# Patient Record
Sex: FEMALE | Race: WHITE | NOT HISPANIC OR LATINO | URBAN - METROPOLITAN AREA
[De-identification: names, ages, dates, MRNs, and addresses within clinical notes are randomized per-mention and may not be internally consistent; named-entity substitution may affect disease eponyms.]

---

## 2024-07-08 ENCOUNTER — TELEPHONE (OUTPATIENT)
Dept: OBSTETRICS AND GYNECOLOGY | Facility: CLINIC | Age: 19
End: 2024-07-08
Payer: COMMERCIAL

## 2024-07-18 ENCOUNTER — LAB (OUTPATIENT)
Dept: LAB | Facility: LAB | Age: 19
End: 2024-07-18
Payer: COMMERCIAL

## 2024-07-18 ENCOUNTER — APPOINTMENT (OUTPATIENT)
Dept: OBSTETRICS AND GYNECOLOGY | Facility: CLINIC | Age: 19
End: 2024-07-18
Payer: COMMERCIAL

## 2024-07-18 VITALS
WEIGHT: 283 LBS | HEIGHT: 66 IN | DIASTOLIC BLOOD PRESSURE: 82 MMHG | SYSTOLIC BLOOD PRESSURE: 134 MMHG | BODY MASS INDEX: 45.48 KG/M2

## 2024-07-18 DIAGNOSIS — O10.019 CHRONIC BENIGN ESSENTIAL HYPERTENSION, ANTEPARTUM (HHS-HCC): ICD-10-CM

## 2024-07-18 DIAGNOSIS — O99.211 OBESITY AFFECTING PREGNANCY IN FIRST TRIMESTER, UNSPECIFIED OBESITY TYPE (HHS-HCC): ICD-10-CM

## 2024-07-18 DIAGNOSIS — Z3A.01 6 WEEKS GESTATION OF PREGNANCY (HHS-HCC): ICD-10-CM

## 2024-07-18 DIAGNOSIS — O99.211 OBESITY AFFECTING PREGNANCY IN FIRST TRIMESTER, UNSPECIFIED OBESITY TYPE (HHS-HCC): Primary | ICD-10-CM

## 2024-07-18 DIAGNOSIS — F41.9 ANXIETY: ICD-10-CM

## 2024-07-18 DIAGNOSIS — Z32.01 PREGNANCY TEST POSITIVE (HHS-HCC): ICD-10-CM

## 2024-07-18 LAB
ABO GROUP (TYPE) IN BLOOD: NORMAL
ANTIBODY SCREEN: NORMAL
ERYTHROCYTE [DISTWIDTH] IN BLOOD BY AUTOMATED COUNT: 18.1 % (ref 11.5–14.5)
HBV SURFACE AG SERPL QL IA: NONREACTIVE
HCT VFR BLD AUTO: 36.4 % (ref 36–46)
HGB BLD-MCNC: 10.3 G/DL (ref 12–16)
HIV 1+2 AB+HIV1 P24 AG SERPL QL IA: NONREACTIVE
MCH RBC QN AUTO: 19.8 PG (ref 26–34)
MCHC RBC AUTO-ENTMCNC: 28.3 G/DL (ref 32–36)
MCV RBC AUTO: 70 FL (ref 80–100)
NRBC BLD-RTO: 0 /100 WBCS (ref 0–0)
PLATELET # BLD AUTO: 328 X10*3/UL (ref 150–450)
RBC # BLD AUTO: 5.2 X10*6/UL (ref 4–5.2)
REFLEX ADDED, ANEMIA PANEL: NORMAL
RH FACTOR (ANTIGEN D): NORMAL
RUBV IGG SERPL IA-ACNC: 1.2 IA
RUBV IGG SERPL QL IA: POSITIVE
TREPONEMA PALLIDUM IGG+IGM AB [PRESENCE] IN SERUM OR PLASMA BY IMMUNOASSAY: NONREACTIVE
WBC # BLD AUTO: 11.8 X10*3/UL (ref 4.4–11.3)

## 2024-07-18 PROCEDURE — 36415 COLL VENOUS BLD VENIPUNCTURE: CPT

## 2024-07-18 PROCEDURE — 87340 HEPATITIS B SURFACE AG IA: CPT

## 2024-07-18 PROCEDURE — 82728 ASSAY OF FERRITIN: CPT

## 2024-07-18 PROCEDURE — 86317 IMMUNOASSAY INFECTIOUS AGENT: CPT

## 2024-07-18 PROCEDURE — 86900 BLOOD TYPING SEROLOGIC ABO: CPT

## 2024-07-18 PROCEDURE — 83036 HEMOGLOBIN GLYCOSYLATED A1C: CPT

## 2024-07-18 PROCEDURE — 85027 COMPLETE CBC AUTOMATED: CPT

## 2024-07-18 PROCEDURE — 86901 BLOOD TYPING SEROLOGIC RH(D): CPT

## 2024-07-18 PROCEDURE — 86780 TREPONEMA PALLIDUM: CPT

## 2024-07-18 PROCEDURE — 87591 N.GONORRHOEAE DNA AMP PROB: CPT

## 2024-07-18 PROCEDURE — 86850 RBC ANTIBODY SCREEN: CPT

## 2024-07-18 PROCEDURE — 84702 CHORIONIC GONADOTROPIN TEST: CPT

## 2024-07-18 PROCEDURE — 87491 CHLMYD TRACH DNA AMP PROBE: CPT

## 2024-07-18 PROCEDURE — 87389 HIV-1 AG W/HIV-1&-2 AB AG IA: CPT

## 2024-07-18 PROCEDURE — 83550 IRON BINDING TEST: CPT

## 2024-07-18 PROCEDURE — 0500F INITIAL PRENATAL CARE VISIT: CPT | Performed by: OBSTETRICS & GYNECOLOGY

## 2024-07-18 PROCEDURE — 87086 URINE CULTURE/COLONY COUNT: CPT

## 2024-07-18 RX ORDER — ASPIRIN 81 MG/1
162 TABLET ORAL DAILY
COMMUNITY

## 2024-07-18 RX ORDER — HYDROXYZINE HYDROCHLORIDE 25 MG/1
25 TABLET, FILM COATED ORAL AS NEEDED
COMMUNITY

## 2024-07-18 ASSESSMENT — EDINBURGH POSTNATAL DEPRESSION SCALE (EPDS)
THE THOUGHT OF HARMING MYSELF HAS OCCURRED TO ME: NEVER
TOTAL SCORE: 9
I HAVE BEEN ANXIOUS OR WORRIED FOR NO GOOD REASON: YES, VERY OFTEN
I HAVE LOOKED FORWARD WITH ENJOYMENT TO THINGS: AS MUCH AS I EVER DID
I HAVE FELT SCARED OR PANICKY FOR NO GOOD REASON: YES, QUITE A LOT
I HAVE BEEN ABLE TO LAUGH AND SEE THE FUNNY SIDE OF THINGS: AS MUCH AS I ALWAYS COULD
I HAVE BLAMED MYSELF UNNECESSARILY WHEN THINGS WENT WRONG: YES, SOME OF THE TIME
I HAVE BEEN SO UNHAPPY THAT I HAVE HAD DIFFICULTY SLEEPING: NOT AT ALL
I HAVE BEEN SO UNHAPPY THAT I HAVE BEEN CRYING: NO, NEVER
THINGS HAVE BEEN GETTING ON TOP OF ME: NO, MOST OF THE TIME I HAVE COPED QUITE WELL
I HAVE FELT SAD OR MISERABLE: NO, NOT AT ALL

## 2024-07-18 NOTE — PROGRESS NOTES
Subjective   Patient ID [id number]   Petrona Baker is a 19 y.o.  at Unknown with a working estimated date of delivery of Not found. who presents for an initial prenatal visit. This pregnancy is unplanned.    Her pregnancy is complicated by:  BMI 45 and anxiety.    OB History    Para Term  AB Living   1             SAB IAB Ectopic Multiple Live Births                  # Outcome Date GA Lbr Sekou/2nd Weight Sex Type Anes PTL Lv   1 Current              [place]  Depression Scale Total: 9    Objective   Physical Exam  Weight: 128 kg (283 lb)  Expected Total Weight Gain: 5 kg (11 lb)-9 kg (19 lb)   Pregravid BMI: 46.99  BP: 134/82          Physical Exam  Constitutional:       Appearance: Normal appearance. She is obese.   Genitourinary:      Bladder, rectum and urethral meatus normal.      Right Labia: No rash or lesions.     Left Labia: No lesions or rash.     No vaginal discharge.      No vaginal prolapse present.     No vaginal atrophy present.       Right Adnexa: not tender and no mass present.     Left Adnexa: not tender and no mass present.     No cervical discharge or lesion.      Uterus is enlarged.      Pelvic exam was performed with patient in the lithotomy position.   Breasts:     Breasts are soft.     Right: Normal.      Left: Normal.   HENT:      Head: Normocephalic and atraumatic.      Nose: Nose normal.   Cardiovascular:      Rate and Rhythm: Normal rate and regular rhythm.      Heart sounds: Normal heart sounds.   Pulmonary:      Effort: Pulmonary effort is normal.      Breath sounds: Normal breath sounds.   Abdominal:      Palpations: Abdomen is soft.   Musculoskeletal:      Cervical back: Neck supple.   Neurological:      General: No focal deficit present.      Mental Status: She is alert and oriented to person, place, and time.   Skin:     General: Skin is warm and dry.      Findings: No rash.   Psychiatric:         Mood and Affect: Mood normal.       Prenatal Labs  Are  ordered.    Problem List Items Addressed This Visit       Obesity affecting pregnancy in first trimester ([facility]) - Primary    Overview     Starting BMI 45. Plan  mg starting at 12 weeks.   Plan Hgb A1c.  Plan weekly AP testing by 34 weeks.   Serial growth usn is planned.          Chronic benign essential hypertension, antepartum ([facility])    Overview     Review of EMR shows past mild range BP not requiring medication.  Plan serial growth usn. Plan home BP monitoring. If hypertension is confirmed will plan delivery at 38-39.6 weeks.          Anxiety    Overview     Has used sertraline and hydroxyzine with good results.          6 weeks gestation of pregnancy ([facility])        Prenatal Labs ordered  Daily prenatal vitamins are recommended.  First trimester screening and second trimester screening discussed. Patient decided to proceeded with NIPS and carrier screening.  Will proceed with dating ultrasound.  Plan 13 week early anatomy usn with follow up visit.   Follow up in 4 weeks for return OB visit.

## 2024-07-19 ENCOUNTER — HOSPITAL ENCOUNTER (OUTPATIENT)
Dept: RADIOLOGY | Facility: CLINIC | Age: 19
Discharge: HOME | End: 2024-07-19
Payer: COMMERCIAL

## 2024-07-19 ENCOUNTER — TELEPHONE (OUTPATIENT)
Dept: OBSTETRICS AND GYNECOLOGY | Facility: CLINIC | Age: 19
End: 2024-07-19
Payer: COMMERCIAL

## 2024-07-19 DIAGNOSIS — Z32.01 PREGNANCY TEST POSITIVE (HHS-HCC): ICD-10-CM

## 2024-07-19 DIAGNOSIS — O99.211 OBESITY AFFECTING PREGNANCY IN FIRST TRIMESTER, UNSPECIFIED OBESITY TYPE (HHS-HCC): ICD-10-CM

## 2024-07-19 LAB
B-HCG SERPL-ACNC: <3 MIU/ML
EST. AVERAGE GLUCOSE BLD GHB EST-MCNC: 103 MG/DL
FERRITIN SERPL-MCNC: 10 NG/ML
HBA1C MFR BLD: 5.2 %
IRON SATN MFR SERPL: 2 %
IRON SERPL-MCNC: 10 UG/DL
TIBC SERPL-MCNC: 450 UG/DL
UIBC SERPL-MCNC: 440 UG/DL

## 2024-07-19 PROCEDURE — 76801 OB US < 14 WKS SINGLE FETUS: CPT

## 2024-07-19 PROCEDURE — 76817 TRANSVAGINAL US OBSTETRIC: CPT

## 2024-07-19 NOTE — TELEPHONE ENCOUNTER
Spoke with Petrona regarding no IUP on ultrasound this morning.  She denies any bleeding or pain. Per Dr. Christina will proceed with beta hcg.  Order placed

## 2024-07-20 DIAGNOSIS — A74.9 CHLAMYDIA INFECTION: Primary | ICD-10-CM

## 2024-07-20 PROBLEM — O10.019 CHRONIC BENIGN ESSENTIAL HYPERTENSION, ANTEPARTUM (HHS-HCC): Status: RESOLVED | Noted: 2024-07-18 | Resolved: 2024-07-20

## 2024-07-20 PROBLEM — Z3A.01 6 WEEKS GESTATION OF PREGNANCY (HHS-HCC): Status: RESOLVED | Noted: 2024-07-18 | Resolved: 2024-07-20

## 2024-07-20 PROBLEM — O99.211 OBESITY AFFECTING PREGNANCY IN FIRST TRIMESTER (HHS-HCC): Status: RESOLVED | Noted: 2024-07-18 | Resolved: 2024-07-20

## 2024-07-20 PROBLEM — N93.8 DUB (DYSFUNCTIONAL UTERINE BLEEDING): Status: ACTIVE | Noted: 2024-07-20

## 2024-07-20 LAB
BACTERIA UR CULT: NORMAL
C TRACH RRNA SPEC QL NAA+PROBE: POSITIVE
N GONORRHOEA DNA SPEC QL PROBE+SIG AMP: NEGATIVE

## 2024-07-20 RX ORDER — DOXYCYCLINE 100 MG/1
100 CAPSULE ORAL 2 TIMES DAILY
Qty: 14 CAPSULE | Refills: 0 | Status: SHIPPED | OUTPATIENT
Start: 2024-07-20 | End: 2024-07-27

## 2024-07-22 ENCOUNTER — TELEPHONE (OUTPATIENT)
Dept: OBSTETRICS AND GYNECOLOGY | Facility: CLINIC | Age: 19
End: 2024-07-22
Payer: COMMERCIAL

## 2024-07-22 DIAGNOSIS — N93.8 DUB (DYSFUNCTIONAL UTERINE BLEEDING): Primary | ICD-10-CM

## 2024-07-22 NOTE — TELEPHONE ENCOUNTER
----- Message from Laya Christina sent at 7/20/2024  7:56 PM EDT -----  Chlamydia screen is positive. I have prescribed doxycycline to treat this. Any sexual partner will also need treated. If they do not have a physician I can prescribe this for them also. We can plan a test of cure at a future visit.

## 2024-07-23 LAB
CYTOLOGY CMNT CVX/VAG CYTO-IMP: NORMAL
LAB AP HPV HR: NORMAL
LAB AP PAP ADDITIONAL TESTS: NORMAL
LABORATORY COMMENT REPORT: NORMAL
LMP START DATE: NORMAL
MENSTRUAL HX REPORTED: NORMAL
PATH REPORT.TOTAL CANCER: NORMAL

## 2024-07-24 ENCOUNTER — TELEPHONE (OUTPATIENT)
Dept: OBSTETRICS AND GYNECOLOGY | Facility: CLINIC | Age: 19
End: 2024-07-24
Payer: COMMERCIAL

## 2024-07-24 PROBLEM — R87.619 ASCUS (ATYPICAL SQUAMOUS CELLS OF UNDETERMINED SIGNIFICANCE) ON GYNECOLOGIC PAPANICOLAOU SMEAR COMPLICATING PREGNANCY, ANTEPARTUM: Status: ACTIVE | Noted: 2024-07-24

## 2024-07-24 PROBLEM — O28.2 ASCUS (ATYPICAL SQUAMOUS CELLS OF UNDETERMINED SIGNIFICANCE) ON GYNECOLOGIC PAPANICOLAOU SMEAR COMPLICATING PREGNANCY, ANTEPARTUM: Status: ACTIVE | Noted: 2024-07-24

## 2024-07-24 NOTE — TELEPHONE ENCOUNTER
----- Message from Laya Christina sent at 7/24/2024 12:58 PM EDT -----  We will plan pap again in 12 months as follow up for ASCUS. If there is no symptoms from yeast on pap this does not need treated.

## 2024-12-09 ENCOUNTER — OFFICE VISIT (OUTPATIENT)
Dept: URGENT CARE | Age: 19
End: 2024-12-09
Payer: COMMERCIAL

## 2024-12-09 VITALS
DIASTOLIC BLOOD PRESSURE: 78 MMHG | OXYGEN SATURATION: 98 % | TEMPERATURE: 98.9 F | SYSTOLIC BLOOD PRESSURE: 163 MMHG | HEART RATE: 142 BPM

## 2024-12-09 DIAGNOSIS — J01.00 ACUTE NON-RECURRENT MAXILLARY SINUSITIS: Primary | ICD-10-CM

## 2024-12-09 PROCEDURE — 99204 OFFICE O/P NEW MOD 45 MIN: CPT | Performed by: NURSE PRACTITIONER

## 2024-12-09 RX ORDER — METHYLPREDNISOLONE 4 MG/1
TABLET ORAL
Qty: 21 TABLET | Refills: 0 | Status: SHIPPED | OUTPATIENT
Start: 2024-12-09 | End: 2024-12-15

## 2024-12-09 RX ORDER — AMOXICILLIN AND CLAVULANATE POTASSIUM 875; 125 MG/1; MG/1
875 TABLET, FILM COATED ORAL 2 TIMES DAILY
Qty: 20 TABLET | Refills: 0 | Status: SHIPPED | OUTPATIENT
Start: 2024-12-09

## 2024-12-09 NOTE — PROGRESS NOTES
Subjective   Patient ID: Petrona Baker is a 19 y.o. female. They present today with a chief complaint of Earache (Bi-lat ear pain, lt eye swollen).    History of Present Illness  18 yo female coming in for bilateral ear pain. She states she has had some sinus congestion for about a week and now the ears are hurting. She states she woke up this am with her left eye swollen and crusty.    Past Medical History  Allergies as of 12/09/2024    (No Known Allergies)       (Not in a hospital admission)       Past Medical History:   Diagnosis Date    6 weeks gestation of pregnancy ([facility]) 07/18/2024    NIPS and carrier screening are desired.      Chronic benign essential hypertension, antepartum ([facility]) 07/18/2024    Review of EMR shows past mild range BP not requiring medication.  Plan serial growth usn. Plan home BP monitoring. If hypertension is confirmed will plan delivery at 38-39.6 weeks.       Obesity affecting pregnancy in first trimester ([facility]) 07/18/2024    Starting BMI 45. Plan  mg starting at 12 weeks.   Hgb A1c 5.2%.  Plan weekly AP testing by 34 weeks.   Serial growth usn is planned.       Personal history of other diseases of the nervous system and sense organs 04/26/2016    History of acute otitis media    Unspecified otitis externa, unspecified ear 06/29/2016    Otitis externa       Past Surgical History:   Procedure Laterality Date    ADENOIDECTOMY  03/28/2017    Adenoidectomy        reports that she has never smoked. She has never used smokeless tobacco. She reports that she does not drink alcohol and does not use drugs.    Review of Systems  Review of Systems:  General: No weight loss, fatigue, anorexia, insomnia, fever, chills.  Eyes: No vision loss, double vision, blurred vision, positive left eye redness and drainage, no eye pain.  ENT: No pharyngitis, dry mouth, positive nasal congestion and sinus pressure, positive bilateral ear pain  Cardiac: No chest pain, palpitations, syncope, near  syncope.  Pulmonary:  No shortness of breath, positive cough, no hemoptysis  Heme/lymph: No swollen glands, fever, bleeding  Skin: No rashes  Neuro: No numbness, tingling, headaches                                 Objective    Vitals:    12/09/24 0850   BP: 163/78   Pulse: (!) 142   Temp: 37.2 °C (98.9 °F)   SpO2: 98%     Patient's last menstrual period was 03/24/2024.    Physical Exam  Physical Exam:  General: Vital noted, no distress. Afebrile  EENT:  Right Eye unremarkable, Left eye with conjunctival injection and upper eyelid swelling, Pupils PERRLA, EOMs intact. TMs unremarkable. Posterior oropharynx unremarkable. Uvula in the midline and non-edematous. No PTA. No retropharyngeal mass. No Bunny's angina.  Cardiac: Regular rate and rhythm, no murmur  Pulmonary: Lungs clear bilaterally with good aeration. No adventitious breath sounds.  Skin: No rashes  Neuro: No focal neurologic deficits, NIH score of 0.      Procedures    Point of Care Test & Imaging Results from this visit  No results found for this visit on 12/09/24.   No results found.    Diagnostic study results (if any) were reviewed by YURI Burgos.    Assessment/Plan   Allergies, medications, history, and pertinent labs/EKGs/Imaging reviewed by YURI Bugros.     Medical Decision Making  Treatment: Augmentin and medrol dose pack prescribed. HR auscultated and at 98 and regular  Differential: 1) sinusitis, 2) conjunctivitis , 3)  otitis media  Plan: Patient will follow up with the PCP in the next 2-3 days. Return for any worsening symptoms or go to the ER for further evaluation. Patient understands return precautions and discharge insturctions.  Impression:   1) sinusitis      Orders and Diagnoses  Diagnoses and all orders for this visit:  Acute non-recurrent maxillary sinusitis  -     amoxicillin-pot clavulanate (Augmentin) 875-125 mg tablet; Take 1 tablet (875 mg) by mouth 2 times a day.  -     methylPREDNISolone (Medrol  Cuauhtemocpak) 4 mg tablets; Follow schedule on package instructions      Medical Admin Record      Patient disposition: Home    Electronically signed by YURI Burgos  9:07 AM

## 2024-12-09 NOTE — LETTER
December 9, 2024     Patient: Petrona Baker   YOB: 2005   Date of Visit: 12/9/2024       To Whom It May Concern:    It is my medical opinion that Petrona Baker may return to work on 12/10/24 .    If you have any questions or concerns, please don't hesitate to call.         Sincerely,        Josselyn Hyde, DALLAS-CNP    CC: No Recipients

## 2024-12-22 ENCOUNTER — OFFICE VISIT (OUTPATIENT)
Dept: URGENT CARE | Age: 19
End: 2024-12-22
Payer: COMMERCIAL

## 2024-12-22 VITALS
OXYGEN SATURATION: 98 % | HEART RATE: 134 BPM | TEMPERATURE: 98.8 F | SYSTOLIC BLOOD PRESSURE: 140 MMHG | DIASTOLIC BLOOD PRESSURE: 99 MMHG | RESPIRATION RATE: 20 BRPM

## 2024-12-22 DIAGNOSIS — J06.9 UPPER RESPIRATORY TRACT INFECTION, UNSPECIFIED TYPE: Primary | ICD-10-CM

## 2024-12-22 PROCEDURE — 99213 OFFICE O/P EST LOW 20 MIN: CPT

## 2024-12-22 PROCEDURE — 1036F TOBACCO NON-USER: CPT

## 2024-12-22 RX ORDER — ALBUTEROL SULFATE 90 UG/1
2 INHALANT RESPIRATORY (INHALATION) EVERY 4 HOURS PRN
Qty: 8 G | Refills: 0 | Status: SHIPPED | OUTPATIENT
Start: 2024-12-22 | End: 2025-12-22

## 2024-12-22 RX ORDER — DOXYCYCLINE 100 MG/1
100 CAPSULE ORAL 2 TIMES DAILY
Qty: 20 CAPSULE | Refills: 0 | Status: SHIPPED | OUTPATIENT
Start: 2024-12-22 | End: 2025-01-01

## 2024-12-22 RX ORDER — PREDNISONE 20 MG/1
40 TABLET ORAL DAILY
Qty: 10 TABLET | Refills: 0 | Status: SHIPPED | OUTPATIENT
Start: 2024-12-22 | End: 2024-12-27

## 2024-12-22 NOTE — PROGRESS NOTES
Subjective   Patient ID: Petrona Baker is a 19 y.o. female. They present today with a chief complaint of Cough, Nasal Congestion, and Sore Throat (Sore throat, sinus congestion, just finished course of antibiotic for sinuses).    History of Present Illness  HPI a 19-year-old female arrives to the clinic with chief complaint of cough, nasal congestion, sore throat.  The patient reports having symptoms over the last couple of weeks.  She was seen and treated here at the same urgent care and was given steroids with Augmentin.  She reports that her symptoms went away however slowly returned once her antibiotics were completed.  She did not use any over-the-counter medications to help with her cough.  She now reports that it is mostly in her lungs with intermittent sinus pressure.  She is here for further evaluation and health maintenance.    Past Medical History  Allergies as of 12/22/2024    (No Known Allergies)       (Not in a hospital admission)       Past Medical History:   Diagnosis Date    6 weeks gestation of pregnancy ([facility]) 07/18/2024    NIPS and carrier screening are desired.      Chronic benign essential hypertension, antepartum ([facility]) 07/18/2024    Review of EMR shows past mild range BP not requiring medication.  Plan serial growth usn. Plan home BP monitoring. If hypertension is confirmed will plan delivery at 38-39.6 weeks.       Obesity affecting pregnancy in first trimester ([facility]) 07/18/2024    Starting BMI 45. Plan  mg starting at 12 weeks.   Hgb A1c 5.2%.  Plan weekly AP testing by 34 weeks.   Serial growth usn is planned.       Personal history of other diseases of the nervous system and sense organs 04/26/2016    History of acute otitis media    Unspecified otitis externa, unspecified ear 06/29/2016    Otitis externa       Past Surgical History:   Procedure Laterality Date    ADENOIDECTOMY  03/28/2017    Adenoidectomy        reports that she has never smoked. She has never used  smokeless tobacco. She reports that she does not drink alcohol and does not use drugs.    Review of Systems  Review of Systems   appetite change, chills, fatigue,  Activity change cough, sinus pressure, and drainage,  Objective    Vitals:    12/22/24 1619   BP: (!) 140/99   Pulse: (!) 134   Resp: 20   Temp: 37.1 °C (98.8 °F)   SpO2: 98%     Patient's last menstrual period was 03/24/2024.    Physical Exam  Constitutional:       Appearance: She is ill-appearing.   HENT:      Head: Normocephalic and atraumatic.      Nose: Congestion and rhinorrhea present.   Cardiovascular:      Rate and Rhythm: Regular rhythm. Tachycardia present.   Pulmonary:      Breath sounds: Wheezing present.         Procedures    Point of Care Test & Imaging Results from this visit  No results found for this visit on 12/22/24.   No results found.    Diagnostic study results (if any) were reviewed by YURI Whitaker.    Assessment/Plan   Allergies, medications, history, and pertinent labs/EKGs/Imaging reviewed by YURI Whitaker.     Medical Decision Making  Upon initial assessment, the patient was sitting calmly the bedside chair in no acute distress.  Physical examination does reveal an erythematous pharynx, mild congestion, ill-appearing look, and wheezes with diminished lung sounds in the left and right upper lobes.  Given her symptoms that has progressively worsened, is reasonable to prescribe an additional antibiotic.  I did send over doxycycline 100 mg oral tablet twice a day for 7 days.  I have also sent prednisone and an albuterol inhaler.  Over-the-counter medications as discussed.  If symptoms are still present despite using antibiotics, please follow-up for a chest x-ray at that time.  Patient agrees to plan of care was discharged stable condition.    As a result of the work-up, the patient was discharged home.  she was informed of her diagnosis and instructed to come back with any concerns or worsening of  condition.  she and was agreeable to the plan as discussed above.  she was given the opportunity to ask questions.  All of the patient's questions were answered.    This document was generated using the assistance of voice recognition software. If there are any errors of spelling, grammar, syntax, or meaning; please feel free to contact me directly for clarification.    Orders and Diagnoses  Diagnoses and all orders for this visit:  Upper respiratory tract infection, unspecified type  -     doxycycline (Vibramycin) 100 mg capsule; Take 1 capsule (100 mg) by mouth 2 times a day for 10 days. Take with at least 8 ounces (large glass) of water, do not lie down for 30 minutes after  -     predniSONE (Deltasone) 20 mg tablet; Take 2 tablets (40 mg) by mouth once daily for 5 days.  -     albuterol (Ventolin HFA) 90 mcg/actuation inhaler; Inhale 2 puffs every 4 hours if needed for wheezing or shortness of breath.      Medical Admin Record      Patient disposition: Home    Electronically signed by YURI Whitaker  4:33 PM

## 2025-03-25 ENCOUNTER — PATIENT MESSAGE (OUTPATIENT)
Dept: OBSTETRICS AND GYNECOLOGY | Facility: CLINIC | Age: 20
End: 2025-03-25
Payer: COMMERCIAL

## 2025-03-25 DIAGNOSIS — O36.80X0 PREGNANCY WITH INCONCLUSIVE FETAL VIABILITY, SINGLE OR UNSPECIFIED FETUS: ICD-10-CM

## 2025-03-26 LAB — B-HCG SERPL-ACNC: ABNORMAL MIU/ML

## 2025-03-27 DIAGNOSIS — O36.80X0 PREGNANCY WITH INCONCLUSIVE FETAL VIABILITY, SINGLE OR UNSPECIFIED FETUS: ICD-10-CM

## 2025-04-04 ENCOUNTER — HOSPITAL ENCOUNTER (OUTPATIENT)
Dept: RADIOLOGY | Facility: CLINIC | Age: 20
Discharge: HOME | End: 2025-04-04
Payer: COMMERCIAL

## 2025-04-04 DIAGNOSIS — O36.80X0 PREGNANCY WITH INCONCLUSIVE FETAL VIABILITY, SINGLE OR UNSPECIFIED FETUS: ICD-10-CM

## 2025-04-04 DIAGNOSIS — O10.919 CHRONIC HYPERTENSION IN PREGNANCY (HHS-HCC): ICD-10-CM

## 2025-04-04 DIAGNOSIS — O99.210 OBESITY IN PREGNANCY (HHS-HCC): ICD-10-CM

## 2025-04-04 PROCEDURE — 76801 OB US < 14 WKS SINGLE FETUS: CPT

## 2025-04-08 ENCOUNTER — APPOINTMENT (OUTPATIENT)
Dept: RADIOLOGY | Facility: CLINIC | Age: 20
End: 2025-04-08
Payer: COMMERCIAL

## 2025-04-10 ENCOUNTER — APPOINTMENT (OUTPATIENT)
Dept: OBSTETRICS AND GYNECOLOGY | Facility: CLINIC | Age: 20
End: 2025-04-10

## 2025-04-10 ENCOUNTER — APPOINTMENT (OUTPATIENT)
Dept: OBSTETRICS AND GYNECOLOGY | Facility: CLINIC | Age: 20
End: 2025-04-10
Payer: COMMERCIAL

## 2025-04-10 ENCOUNTER — APPOINTMENT (OUTPATIENT)
Dept: LAB | Facility: HOSPITAL | Age: 20
End: 2025-04-10
Payer: COMMERCIAL

## 2025-04-10 VITALS — SYSTOLIC BLOOD PRESSURE: 142 MMHG | DIASTOLIC BLOOD PRESSURE: 84 MMHG | BODY MASS INDEX: 48.1 KG/M2 | WEIGHT: 293 LBS

## 2025-04-10 DIAGNOSIS — O10.019 CHRONIC BENIGN ESSENTIAL HYPERTENSION, ANTEPARTUM (HHS-HCC): Primary | ICD-10-CM

## 2025-04-10 DIAGNOSIS — Z3A.09 9 WEEKS GESTATION OF PREGNANCY (HHS-HCC): ICD-10-CM

## 2025-04-10 DIAGNOSIS — O09.291 CURRENT PREGNANCY IN FIRST TRIMESTER WITH HISTORY OF SPONTANEOUS ABORTION DURING PRIOR PREGNANCY (HHS-HCC): ICD-10-CM

## 2025-04-10 DIAGNOSIS — Z3A.08 8 WEEKS GESTATION OF PREGNANCY (HHS-HCC): ICD-10-CM

## 2025-04-10 DIAGNOSIS — O99.210 MATERNAL OBESITY, ANTEPARTUM (HHS-HCC): ICD-10-CM

## 2025-04-10 LAB
ERYTHROCYTE [DISTWIDTH] IN BLOOD BY AUTOMATED COUNT: 19.1 % (ref 11.5–14.5)
FERRITIN SERPL-MCNC: 16 NG/ML
HCT VFR BLD AUTO: 33.5 % (ref 36–46)
HGB BLD-MCNC: 9.4 G/DL (ref 12–16)
IRON SATN MFR SERPL: NORMAL %
IRON SERPL-MCNC: 18 UG/DL
MCH RBC QN AUTO: 19.5 PG (ref 26–34)
MCHC RBC AUTO-ENTMCNC: 28.1 G/DL (ref 32–36)
MCV RBC AUTO: 70 FL (ref 80–100)
NRBC BLD-RTO: 0 /100 WBCS (ref 0–0)
PLATELET # BLD AUTO: 312 X10*3/UL (ref 150–450)
RBC # BLD AUTO: 4.82 X10*6/UL (ref 4–5.2)
REFLEX ADDED, ANEMIA PANEL: NORMAL
TIBC SERPL-MCNC: NORMAL UG/DL
UIBC SERPL-MCNC: >450 UG/DL
WBC # BLD AUTO: 10.6 X10*3/UL (ref 4.4–11.3)

## 2025-04-10 PROCEDURE — 87591 N.GONORRHOEAE DNA AMP PROB: CPT

## 2025-04-10 PROCEDURE — 83550 IRON BINDING TEST: CPT

## 2025-04-10 PROCEDURE — 87491 CHLMYD TRACH DNA AMP PROBE: CPT

## 2025-04-10 PROCEDURE — 85027 COMPLETE CBC AUTOMATED: CPT

## 2025-04-10 PROCEDURE — 82728 ASSAY OF FERRITIN: CPT

## 2025-04-10 PROCEDURE — 0500F INITIAL PRENATAL CARE VISIT: CPT | Performed by: OBSTETRICS & GYNECOLOGY

## 2025-04-10 ASSESSMENT — EDINBURGH POSTNATAL DEPRESSION SCALE (EPDS)
I HAVE BEEN SO UNHAPPY THAT I HAVE HAD DIFFICULTY SLEEPING: NOT AT ALL
THE THOUGHT OF HARMING MYSELF HAS OCCURRED TO ME: NEVER
I HAVE BEEN SO UNHAPPY THAT I HAVE BEEN CRYING: NO, NEVER
I HAVE BEEN ANXIOUS OR WORRIED FOR NO GOOD REASON: YES, VERY OFTEN
TOTAL SCORE: 7
I HAVE LOOKED FORWARD WITH ENJOYMENT TO THINGS: AS MUCH AS I EVER DID
I HAVE FELT SAD OR MISERABLE: NO, NOT AT ALL
I HAVE BEEN ABLE TO LAUGH AND SEE THE FUNNY SIDE OF THINGS: AS MUCH AS I ALWAYS COULD
I HAVE BLAMED MYSELF UNNECESSARILY WHEN THINGS WENT WRONG: YES, SOME OF THE TIME
I HAVE FELT SCARED OR PANICKY FOR NO GOOD REASON: YES, SOMETIMES
THINGS HAVE BEEN GETTING ON TOP OF ME: NO, I HAVE BEEN COPING AS WELL AS EVER

## 2025-04-10 NOTE — PROGRESS NOTES
Subjective   Patient ID [id number]   Petrona Baker is a 20 y.o.  at 9w0d with a working estimated date of delivery of 2025, by Ultrasound who presents for an initial prenatal visit.    Her pregnancy is complicated by:  Chronic hypertension on no medication, BMI 48, history of spontaneous miscarriage.    OB History    Para Term  AB Living   2       1     SAB IAB Ectopic Multiple Live Births   1              # Outcome Date GA Lbr Sekou/2nd Weight Sex Type Anes PTL Lv   2 Current            1 SAB                   Objective   Physical Exam  Weight: 135 kg (298 lb)  Expected Total Weight Gain: 5 kg (11 lb)-9 kg (19 lb)   Pregravid BMI: 48.12  BP: 142/84          Physical Exam  Constitutional:       Appearance: Normal appearance. She is obese.   Genitourinary:      Bladder, rectum and urethral meatus normal.      Right Labia: No rash or lesions.     Left Labia: No lesions or rash.     No vaginal discharge.      No vaginal prolapse present.     No vaginal atrophy present.       Right Adnexa: not tender and no mass present.     Left Adnexa: not tender and no mass present.     No cervical discharge or lesion.      Uterus is enlarged.      Pelvic exam was performed with patient in the lithotomy position.   Breasts:     Breasts are soft.     Right: Normal.      Left: Normal.   HENT:      Head: Normocephalic and atraumatic.      Nose: Nose normal.   Cardiovascular:      Rate and Rhythm: Normal rate and regular rhythm.      Heart sounds: Normal heart sounds.   Pulmonary:      Effort: Pulmonary effort is normal.      Breath sounds: Normal breath sounds.   Abdominal:      Palpations: Abdomen is soft.   Musculoskeletal:      Cervical back: Neck supple.   Neurological:      General: No focal deficit present.      Mental Status: She is alert and oriented to person, place, and time.   Skin:     General: Skin is warm and dry.      Findings: No rash.   Psychiatric:         Mood and Affect: Mood normal.        Prenatal Labs  Are ordered in addition to [facility] labs for baseline.    Problem List Items Addressed This Visit          Medium    Maternal obesity, antepartum ([facility])    Overview     Starting BMI 48. Plan serial growth imaging.  Weekly AP testing is recommended starting at 34 weeks.  Will plan delivery at [facility] if BMI reaches 50.         Current pregnancy in first trimester with history of spontaneous  during prior pregnancy ([facility])    Overview     Prior early miscarriage.         Chronic benign essential hypertension, antepartum ([facility]) - Primary    Overview     Review of EMR shows past mild range BP not requiring medication.  Will monitor BP twice daily.  Plan serial growth usn. Plan home BP monitoring. If hypertension is confirmed will plan delivery at 38-39.6 weeks.          8 weeks gestation of pregnancy ([facility])    Overview     Desired provider in labor: [] CNM  [] Physician   [] Either Acceptable  [] Blood Products: [] Yes, accepts [] No, needs counseling  [x] Initial BMI: 48.12   [] Prenatal Labs:   [] Cervical Cancer Screening up to date  [] Rh status:   [] Screen for IPV and Substance Use Risk:  [] Genetic Screening (cfDNA):    [] First Trimester Anatomy Screen (11-13.6 wks):  [] Baby ASA (initiated):  [] Pregnancy dated by:     [] Anatomy US: (19-20 wks)  [] Federal Sterilization consent signed (if indicated):  [] 1hr GCT at 24-28wks:  [] Rhogam (if indicated):   [] Fetal Surveillance (if indicated):  [] Tdap (27-32 wks, may be given up to 36 wks if initial window missed):   [] RSV (32-36 wks) (Sept. to end ):     [] Feeding Intentions:  [] Postpartum Birth control method:   [] GBS at 36 - 37 wks:  [] 39 weeks discussion of IOL vs. Expectant management:  [] Mode of delivery ( anticipated ):           Other Visit Diagnoses       9 weeks gestation of pregnancy ([facility])                 Prenatal Labs ordered  Daily prenatal vitamins are recommended.  First trimester screening and second  trimester screening discussed. Patient decided to proceed with NIPS and is considering carrier screen. Will plan to obtain this at next visit.   Early anatomy usn is scheduled.  Follow up in 4 weeks for return OB visit.

## 2025-04-11 DIAGNOSIS — O99.019 ANEMIA, ANTEPARTUM: Primary | ICD-10-CM

## 2025-04-11 LAB
ALBUMIN SERPL-MCNC: 4.2 G/DL (ref 3.6–5.1)
ALP SERPL-CCNC: 57 U/L (ref 31–125)
ALT SERPL-CCNC: 9 U/L (ref 6–29)
ANION GAP SERPL CALCULATED.4IONS-SCNC: 9 MMOL/L (CALC) (ref 7–17)
AST SERPL-CCNC: 14 U/L (ref 10–30)
BILIRUB SERPL-MCNC: 0.3 MG/DL (ref 0.2–1.2)
BUN SERPL-MCNC: 10 MG/DL (ref 7–25)
CALCIUM SERPL-MCNC: 9.3 MG/DL (ref 8.6–10.2)
CHLORIDE SERPL-SCNC: 105 MMOL/L (ref 98–110)
CO2 SERPL-SCNC: 22 MMOL/L (ref 20–32)
CREAT SERPL-MCNC: 0.5 MG/DL (ref 0.5–0.96)
EGFRCR SERPLBLD CKD-EPI 2021: 136 ML/MIN/1.73M2
EST. AVERAGE GLUCOSE BLD GHB EST-MCNC: 97 MG/DL
EST. AVERAGE GLUCOSE BLD GHB EST-SCNC: 5.4 MMOL/L
GLUCOSE SERPL-MCNC: 86 MG/DL (ref 65–99)
HBA1C MFR BLD: 5 % OF TOTAL HGB
HBV SURFACE AG SERPL QL IA: NORMAL
HCV AB SERPL QL IA: NORMAL
HIV 1+2 AB+HIV1 P24 AG SERPL QL IA: NORMAL
LDH SERPL P TO L-CCNC: 138 U/L (ref 100–200)
POTASSIUM SERPL-SCNC: 4 MMOL/L (ref 3.5–5.3)
PROT SERPL-MCNC: 6.9 G/DL (ref 6.1–8.1)
RUBV IGG SERPL IA-ACNC: NORMAL
SODIUM SERPL-SCNC: 136 MMOL/L (ref 135–146)
T PALLIDUM AB SER QL IA: NORMAL
URATE SERPL-MCNC: 5.6 MG/DL (ref 2.5–7)

## 2025-04-11 RX ORDER — FERROUS SULFATE 325(65) MG
325 TABLET, DELAYED RELEASE (ENTERIC COATED) ORAL 2 TIMES DAILY
Qty: 60 TABLET | Refills: 11 | Status: SHIPPED | OUTPATIENT
Start: 2025-04-11 | End: 2026-04-11

## 2025-04-12 PROBLEM — O99.820 GROUP B STREPTOCOCCUS CARRIER STATE AFFECTING PREGNANCY: Status: ACTIVE | Noted: 2025-04-12

## 2025-04-12 LAB
BACTERIA UR CULT: ABNORMAL
C TRACH RRNA SPEC QL NAA+PROBE: NEGATIVE
CREAT UR-MCNC: 175 MG/DL (ref 20–275)
N GONORRHOEA DNA SPEC QL PROBE+SIG AMP: NEGATIVE
PROT UR-MCNC: 19 MG/DL (ref 5–24)
PROT/CREAT UR: 0.11 MG/MG CREAT (ref 0.02–0.18)
PROT/CREAT UR: 109 MG/G CREAT (ref 24–184)

## 2025-04-15 LAB
ALBUMIN SERPL-MCNC: 4.2 G/DL (ref 3.6–5.1)
ALP SERPL-CCNC: 57 U/L (ref 31–125)
ALT SERPL-CCNC: 9 U/L (ref 6–29)
ANION GAP SERPL CALCULATED.4IONS-SCNC: 9 MMOL/L (CALC) (ref 7–17)
AST SERPL-CCNC: 14 U/L (ref 10–30)
BILIRUB SERPL-MCNC: 0.3 MG/DL (ref 0.2–1.2)
BUN SERPL-MCNC: 10 MG/DL (ref 7–25)
CALCIUM SERPL-MCNC: 9.3 MG/DL (ref 8.6–10.2)
CHLORIDE SERPL-SCNC: 105 MMOL/L (ref 98–110)
CO2 SERPL-SCNC: 22 MMOL/L (ref 20–32)
CREAT SERPL-MCNC: 0.5 MG/DL (ref 0.5–0.96)
EGFRCR SERPLBLD CKD-EPI 2021: 136 ML/MIN/1.73M2
EST. AVERAGE GLUCOSE BLD GHB EST-MCNC: 97 MG/DL
EST. AVERAGE GLUCOSE BLD GHB EST-SCNC: 5.4 MMOL/L
GLUCOSE SERPL-MCNC: 86 MG/DL (ref 65–99)
HBA1C MFR BLD: 5 % OF TOTAL HGB
HBV SURFACE AG SERPL QL IA: NORMAL
HCV AB SERPL QL IA: NORMAL
HIV 1+2 AB+HIV1 P24 AG SERPL QL IA: NORMAL
LDH SERPL P TO L-CCNC: 138 U/L (ref 100–200)
POTASSIUM SERPL-SCNC: 4 MMOL/L (ref 3.5–5.3)
PROT SERPL-MCNC: 6.9 G/DL (ref 6.1–8.1)
RUBV IGG SERPL IA-ACNC: 1.73 INDEX
SODIUM SERPL-SCNC: 136 MMOL/L (ref 135–146)
T PALLIDUM AB SER QL IA: NEGATIVE
URATE SERPL-MCNC: 5.6 MG/DL (ref 2.5–7)

## 2025-04-21 LAB
CYTOLOGY CMNT CVX/VAG CYTO-IMP: NORMAL
LAB AP HPV HR: NORMAL
LAB AP PAP ADDITIONAL TESTS: NORMAL
LABORATORY COMMENT REPORT: NORMAL
MENSTRUAL HX REPORTED: NORMAL
PATH REPORT.TOTAL CANCER: NORMAL
RESIDENT REVIEW: NORMAL

## 2025-05-02 ENCOUNTER — HOSPITAL ENCOUNTER (OUTPATIENT)
Dept: RADIOLOGY | Facility: CLINIC | Age: 20
Discharge: HOME | End: 2025-05-02
Payer: COMMERCIAL

## 2025-05-02 DIAGNOSIS — O10.019: ICD-10-CM

## 2025-05-02 DIAGNOSIS — O99.011 ANEMIA OF MOTHER IN PREGNANCY, ANTEPARTUM, FIRST TRIMESTER: ICD-10-CM

## 2025-05-02 DIAGNOSIS — O99.210 OBESITY IN PREGNANCY (HHS-HCC): ICD-10-CM

## 2025-05-02 DIAGNOSIS — O36.80X0 PREGNANCY WITH INCONCLUSIVE FETAL VIABILITY, SINGLE OR UNSPECIFIED FETUS: ICD-10-CM

## 2025-05-02 PROCEDURE — 76816 OB US FOLLOW-UP PER FETUS: CPT

## 2025-05-02 PROCEDURE — 76813 OB US NUCHAL MEAS 1 GEST: CPT

## 2025-05-05 ENCOUNTER — LAB (OUTPATIENT)
Dept: LAB | Facility: HOSPITAL | Age: 20
End: 2025-05-05
Payer: COMMERCIAL

## 2025-05-05 DIAGNOSIS — O09.291 SUPERVISION OF PREGNANCY WITH OTHER POOR REPRODUCTIVE OR OBSTETRIC HISTORY, FIRST TRIMESTER (HHS-HCC): Primary | ICD-10-CM

## 2025-05-15 PROBLEM — Z3A.15 15 WEEKS GESTATION OF PREGNANCY (HHS-HCC): Status: ACTIVE | Noted: 2025-04-10

## 2025-05-15 LAB
COMMENTS - MP RESULT TYPE: NORMAL
SCAN RESULT: NORMAL

## 2025-05-22 ENCOUNTER — APPOINTMENT (OUTPATIENT)
Dept: OBSTETRICS AND GYNECOLOGY | Facility: CLINIC | Age: 20
End: 2025-05-22
Payer: COMMERCIAL

## 2025-05-22 VITALS — WEIGHT: 293 LBS | DIASTOLIC BLOOD PRESSURE: 70 MMHG | BODY MASS INDEX: 47.78 KG/M2 | SYSTOLIC BLOOD PRESSURE: 128 MMHG

## 2025-05-22 DIAGNOSIS — O99.210 MATERNAL OBESITY, ANTEPARTUM (HHS-HCC): ICD-10-CM

## 2025-05-22 DIAGNOSIS — O99.820 GROUP B STREPTOCOCCUS CARRIER STATE AFFECTING PREGNANCY: ICD-10-CM

## 2025-05-22 DIAGNOSIS — O99.019 ANEMIA, ANTEPARTUM: ICD-10-CM

## 2025-05-22 DIAGNOSIS — Z3A.15 15 WEEKS GESTATION OF PREGNANCY (HHS-HCC): Primary | ICD-10-CM

## 2025-05-22 DIAGNOSIS — O10.019 CHRONIC BENIGN ESSENTIAL HYPERTENSION, ANTEPARTUM (HHS-HCC): ICD-10-CM

## 2025-05-22 PROBLEM — O09.292 CURRENT PREGNANCY IN SECOND TRIMESTER WITH HISTORY OF SPONTANEOUS ABORTION DURING PRIOR PREGNANCY (HHS-HCC): Status: ACTIVE | Noted: 2025-04-10

## 2025-05-22 PROCEDURE — 0501F PRENATAL FLOW SHEET: CPT | Performed by: OBSTETRICS & GYNECOLOGY

## 2025-05-22 NOTE — PROGRESS NOTES
Subjective   Patient ID [id number]   Petrona Baker is a 20 y.o.   at 15w0d with a working estimated date of delivery of 2025, by Ultrasound who presents for a routine prenatal visit.   Nausea is mild. Home BP log is with normal pressures.    Objective   Physical Exam  Weight: 134 kg (296 lb)  Expected Total Weight Gain: 5 kg (11 lb)-9 kg (19 lb)   Pregravid BMI: 48.12  BP: 128/70  Fetal Heart Rate: 135            Problem List Items Addressed This Visit          Medium    15 weeks gestation of pregnancy ([facility]) - Primary    Overview   Desired provider in labor: [] CNM  [] Physician   [] Either Acceptable  [x] Blood Products: [x] Yes, accepts [] No, needs counseling  [x] Initial BMI: 48.12   [x] Prenatal Labs: anemia  [x] Cervical Cancer Screening up to date:   [x] Rh status: positive  [x] Screen for IPV and Substance Use Risk:  [x] Genetic Screening (cfDNA):  rrNIPS  [x] First Trimester Anatomy Screen (11-13.6 wks):Nuchal translucency could not be measured within FMF criteria but appears subjectively normal today. It does not appear thickened or cystic.   [x] Baby ASA (initiated):  [x] Pregnancy dated by: 8 week ultrasound    [x] Anatomy US: (19-20 wks)  [] Federal Sterilization consent signed (if indicated):  [] 1hr GCT at 24-28wks:  [] Rhogam (if indicated): not indicated  [] Fetal Surveillance (if indicated):  [] Tdap (27-32 wks, may be given up to 36 wks if initial window missed):   [] RSV (32-36 wks) (Sept. to end of ):     [] Feeding Intentions:  [] Postpartum Birth control method:   [x] GBS at 36 - 37 wks: Positive in urine 2025  [] 39 weeks discussion of IOL vs. Expectant management:  [] Mode of delivery ( anticipated ):          Anemia, antepartum    Overview   Iron is prescribed for anemia noted at start of pregnancy.         Chronic benign essential hypertension, antepartum ([facility])    Overview   Review of EMR shows past mild range BP not requiring medication.  Will monitor BP  twice daily.  Plan serial growth usn. Plan home BP monitoring. If hypertension is confirmed will plan delivery at 38-39.6 weeks.          Group B Streptococcus carrier state affecting pregnancy    Overview   GBBS colonization is noted on initial UTI. Plan antibiotics in labor.         Maternal obesity, antepartum ([facility])    Overview   Starting BMI 48. Plan serial growth imaging.  Weekly AP testing is recommended starting at 34 weeks.  [place] [facility] [place] 50.               Follow up as scheduled for a routine prenatal visit.

## 2025-05-27 DIAGNOSIS — O21.9 NAUSEA AND VOMITING DURING PREGNANCY: Primary | ICD-10-CM

## 2025-05-27 RX ORDER — ONDANSETRON 4 MG/1
4 TABLET, ORALLY DISINTEGRATING ORAL EVERY 8 HOURS PRN
Qty: 20 TABLET | Refills: 3 | Status: SHIPPED | OUTPATIENT
Start: 2025-05-27 | End: 2025-06-03

## 2025-06-26 ENCOUNTER — APPOINTMENT (OUTPATIENT)
Dept: OBSTETRICS AND GYNECOLOGY | Facility: CLINIC | Age: 20
End: 2025-06-26
Payer: COMMERCIAL

## 2025-06-26 ENCOUNTER — HOSPITAL ENCOUNTER (OUTPATIENT)
Dept: RADIOLOGY | Facility: CLINIC | Age: 20
Discharge: HOME | End: 2025-06-26
Payer: COMMERCIAL

## 2025-06-26 DIAGNOSIS — O36.80X0 PREGNANCY WITH INCONCLUSIVE FETAL VIABILITY, SINGLE OR UNSPECIFIED FETUS: ICD-10-CM

## 2025-06-26 PROCEDURE — 76811 OB US DETAILED SNGL FETUS: CPT

## 2025-07-01 DIAGNOSIS — O10.019 CHRONIC BENIGN ESSENTIAL HYPERTENSION, ANTEPARTUM (HHS-HCC): Primary | ICD-10-CM

## 2025-07-01 RX ORDER — NIFEDIPINE 30 MG/1
30 TABLET, FILM COATED, EXTENDED RELEASE ORAL
Qty: 30 TABLET | Refills: 11 | Status: SHIPPED | OUTPATIENT
Start: 2025-07-01 | End: 2026-07-01

## 2025-07-02 ENCOUNTER — ROUTINE PRENATAL (OUTPATIENT)
Dept: OBSTETRICS AND GYNECOLOGY | Facility: CLINIC | Age: 20
End: 2025-07-02
Payer: COMMERCIAL

## 2025-07-02 VITALS — WEIGHT: 292 LBS | SYSTOLIC BLOOD PRESSURE: 118 MMHG | BODY MASS INDEX: 47.13 KG/M2 | DIASTOLIC BLOOD PRESSURE: 88 MMHG

## 2025-07-02 DIAGNOSIS — F41.9 ANXIETY: ICD-10-CM

## 2025-07-02 DIAGNOSIS — O09.292 CURRENT PREGNANCY IN SECOND TRIMESTER WITH HISTORY OF SPONTANEOUS ABORTION DURING PRIOR PREGNANCY (HHS-HCC): ICD-10-CM

## 2025-07-02 DIAGNOSIS — O99.210 MATERNAL OBESITY, ANTEPARTUM (HHS-HCC): ICD-10-CM

## 2025-07-02 DIAGNOSIS — O10.019 CHRONIC BENIGN ESSENTIAL HYPERTENSION, ANTEPARTUM (HHS-HCC): ICD-10-CM

## 2025-07-02 DIAGNOSIS — O99.019 ANEMIA, ANTEPARTUM: Primary | ICD-10-CM

## 2025-07-02 PROBLEM — Z3A.20 20 WEEKS GESTATION OF PREGNANCY (HHS-HCC): Status: ACTIVE | Noted: 2025-04-10

## 2025-07-02 PROCEDURE — 0501F PRENATAL FLOW SHEET: CPT | Performed by: OBSTETRICS & GYNECOLOGY

## 2025-07-02 NOTE — PROGRESS NOTES
Subjective   Patient ID [id number]   Petrona Baker is a 20 y.o.   at 20w6d with a working estimated date of delivery of 2025, by Ultrasound who presents for a routine prenatal visit.   Home BP is running in low 140's/80's. She will be starting Nifedipine.   Nausea is improving.  Objective   Physical Exam  Weight: 132 kg (292 lb)  Expected Total Weight Gain: 5 kg (11 lb)-9 kg (19 lb)   Pregravid BMI: 48.12  BP: 118/88  Fetal Heart Rate: 150 Fundal Height (cm): 20 cm          Problem List Items Addressed This Visit          Medium    Anemia, antepartum - Primary    Overview   Iron is prescribed for anemia noted at start of pregnancy.         Anxiety    Overview   Has used sertraline and hydroxyzine with good results.          Chronic benign essential hypertension, antepartum ([facility])    Overview   Review of EMR shows past mild range BP not requiring medication. Nifedipine ER 30 mg was started at 15 weeks gestation.  Will monitor BP twice daily.  Plan serial growth usn. Plan home BP monitoring. Will plan delivery at 38-39.6 weeks.          Current pregnancy in second trimester with history of spontaneous  during prior pregnancy ([facility])    Overview   Prior early miscarriage.         Maternal obesity, antepartum ([facility])    Overview   Starting BMI 48. Plan serial growth imaging.  Weekly AP testing is recommended starting at 34 weeks.  Will plan [place] at [facility] if BMI reaches 50.               Follow up as scheduled for a routine prenatal visit.

## 2025-07-09 ENCOUNTER — APPOINTMENT (OUTPATIENT)
Dept: OBSTETRICS AND GYNECOLOGY | Facility: CLINIC | Age: 20
End: 2025-07-09
Payer: COMMERCIAL

## 2025-07-10 DIAGNOSIS — F41.9 ANXIETY: Primary | ICD-10-CM

## 2025-07-10 RX ORDER — HYDROXYZINE HYDROCHLORIDE 25 MG/1
25 TABLET, FILM COATED ORAL EVERY 6 HOURS PRN
Qty: 45 TABLET | Refills: 1 | Status: SHIPPED | OUTPATIENT
Start: 2025-07-10

## 2025-07-14 ENCOUNTER — HOSPITAL ENCOUNTER (OUTPATIENT)
Dept: RADIOLOGY | Facility: CLINIC | Age: 20
Discharge: HOME | End: 2025-07-14
Payer: COMMERCIAL

## 2025-07-14 DIAGNOSIS — O36.80X0 PREGNANCY WITH INCONCLUSIVE FETAL VIABILITY, SINGLE OR UNSPECIFIED FETUS: ICD-10-CM

## 2025-07-14 PROCEDURE — 76816 OB US FOLLOW-UP PER FETUS: CPT

## 2025-07-14 PROCEDURE — 76816 OB US FOLLOW-UP PER FETUS: CPT | Performed by: STUDENT IN AN ORGANIZED HEALTH CARE EDUCATION/TRAINING PROGRAM

## 2025-07-24 ENCOUNTER — APPOINTMENT (OUTPATIENT)
Dept: OBSTETRICS AND GYNECOLOGY | Facility: CLINIC | Age: 20
End: 2025-07-24
Payer: COMMERCIAL

## 2025-07-27 DIAGNOSIS — O09.292 CURRENT PREGNANCY IN SECOND TRIMESTER WITH HISTORY OF SPONTANEOUS ABORTION DURING PRIOR PREGNANCY (HHS-HCC): ICD-10-CM

## 2025-07-29 ENCOUNTER — APPOINTMENT (OUTPATIENT)
Dept: OBSTETRICS AND GYNECOLOGY | Facility: CLINIC | Age: 20
End: 2025-07-29
Payer: COMMERCIAL

## 2025-07-29 ENCOUNTER — APPOINTMENT (OUTPATIENT)
Dept: LAB | Facility: HOSPITAL | Age: 20
End: 2025-07-29
Payer: COMMERCIAL

## 2025-07-29 VITALS
WEIGHT: 292 LBS | HEIGHT: 66 IN | SYSTOLIC BLOOD PRESSURE: 134 MMHG | DIASTOLIC BLOOD PRESSURE: 78 MMHG | BODY MASS INDEX: 46.93 KG/M2

## 2025-07-29 DIAGNOSIS — O09.292 CURRENT PREGNANCY IN SECOND TRIMESTER WITH HISTORY OF SPONTANEOUS ABORTION DURING PRIOR PREGNANCY (HHS-HCC): ICD-10-CM

## 2025-07-29 DIAGNOSIS — O10.019 CHRONIC BENIGN ESSENTIAL HYPERTENSION, ANTEPARTUM (HHS-HCC): ICD-10-CM

## 2025-07-29 DIAGNOSIS — Z3A.24 24 WEEKS GESTATION OF PREGNANCY (HHS-HCC): ICD-10-CM

## 2025-07-29 DIAGNOSIS — O99.019 ANEMIA, ANTEPARTUM: ICD-10-CM

## 2025-07-29 DIAGNOSIS — O99.210 MATERNAL OBESITY, ANTEPARTUM (HHS-HCC): Primary | ICD-10-CM

## 2025-07-29 LAB
ERYTHROCYTE [DISTWIDTH] IN BLOOD BY AUTOMATED COUNT: 19.6 % (ref 11.5–14.5)
HCT VFR BLD AUTO: 41.4 % (ref 36–46)
HGB BLD-MCNC: 12.8 G/DL (ref 12–16)
MCH RBC QN AUTO: 25.8 PG (ref 26–34)
MCHC RBC AUTO-ENTMCNC: 30.9 G/DL (ref 32–36)
MCV RBC AUTO: 84 FL (ref 80–100)
NRBC BLD-RTO: 0 /100 WBCS (ref 0–0)
PLATELET # BLD AUTO: 236 X10*3/UL (ref 150–450)
RBC # BLD AUTO: 4.96 X10*6/UL (ref 4–5.2)
REFLEX ADDED, ANEMIA PANEL: NORMAL
WBC # BLD AUTO: 14 X10*3/UL (ref 4.4–11.3)

## 2025-07-29 PROCEDURE — 85027 COMPLETE CBC AUTOMATED: CPT

## 2025-07-29 PROCEDURE — 0501F PRENATAL FLOW SHEET: CPT | Performed by: OBSTETRICS & GYNECOLOGY

## 2025-07-29 RX ORDER — ONDANSETRON 4 MG/1
TABLET, ORALLY DISINTEGRATING ORAL
COMMUNITY
Start: 2025-07-23

## 2025-07-29 NOTE — PROGRESS NOTES
Subjective   Patient ID [id number]   Petrona Baker is a 20 y.o.   at 24w5d with a working estimated date of delivery of 2025, by Ultrasound who presents for a routine prenatal visit. Home BP is in target range with Nifedipine.    Objective   Physical Exam     Expected Total Weight Gain: 5 kg (11 lb)-9 kg (19 lb)   Pregravid BMI: 48.12  BP: 134/78  Fetal Heart Rate: 150 Fundal Height (cm): 26 cm          Problem List Items Addressed This Visit          Medium    Maternal obesity, antepartum ([facility]) - Primary    Overview   Starting BMI 48. Plan serial growth imaging.  Weekly AP testing is recommended starting at 34 weeks.  [place] [facility] [place]         Current pregnancy in second trimester with history of spontaneous  during prior pregnancy ([facility])    Overview   Prior early miscarriage.         Relevant Orders    CBC Anemia Panel With Reflex,Pregnancy    Glucose, 1 Hour Screen, Pregnancy    Syphilis Screen with Reflex    Chronic benign essential hypertension, antepartum ([facility])    Overview   Review of EMR shows past mild range BP not requiring medication. Nifedipine ER 30 mg was started at 15 weeks gestation.  Will monitor BP twice daily.  Plan serial growth usn. Plan home BP monitoring. Will plan delivery at 38-39.6 weeks.          Anemia, antepartum    Overview   Iron is prescribed for anemia noted at start of pregnancy.         24 weeks gestation of pregnancy ([facility])    Overview   Desired provider in labor: [] CNM  [] Physician   [] Either Acceptable  [x] Blood Products: [x] Yes, accepts [] No, needs counseling  [x] Initial BMI: 48.12   [x] Prenatal Labs: anemia  [x] Cervical Cancer Screening up to date:   [x] Rh status: positive  [x] Screen for IPV and Substance Use Risk:  [x] Genetic Screening (cfDNA):  rrNIPS  [x] First Trimester Anatomy Screen (11-13.6 wks):Nuchal translucency could not be measured within FMF criteria but appears subjectively normal today.  It does not appear thickened or cystic.   [x] Baby ASA (initiated):  [x] Pregnancy dated by: 8 week ultrasound    [x] Anatomy US: (19-20 wks)6/26  [] Federal Sterilization consent signed (if indicated):  [] 1hr GCT at 24-28wks:  [] Rhogam (if indicated): not indicated  [] Fetal Surveillance (if indicated):  [] Tdap (27-32 wks, may be given up to 36 wks if initial window missed):   [] RSV (32-36 wks) (Sept. to end of Jan):     [] Feeding Intentions:  [] Postpartum Birth control method:   [x] GBS at 36 - 37 wks: Positive in urine 4/2025  [] 39 weeks discussion of IOL vs. Expectant management:  [] Mode of delivery ( anticipated ):                Follow up as scheduled for a routine prenatal visit.

## 2025-08-03 LAB
GLUCOSE 1H P 50 G GLC PO SERPL-MCNC: 110 MG/DL
T PALLIDUM AB SER QL IA: NEGATIVE

## 2025-08-08 ENCOUNTER — APPOINTMENT (OUTPATIENT)
Dept: RADIOLOGY | Facility: CLINIC | Age: 20
End: 2025-08-08
Payer: COMMERCIAL

## 2025-08-18 PROBLEM — Z3A.28 28 WEEKS GESTATION OF PREGNANCY (HHS-HCC): Status: ACTIVE | Noted: 2025-04-10

## 2025-08-21 ENCOUNTER — APPOINTMENT (OUTPATIENT)
Dept: OBSTETRICS AND GYNECOLOGY | Facility: CLINIC | Age: 20
End: 2025-08-21
Payer: COMMERCIAL

## 2025-08-21 ENCOUNTER — HOSPITAL ENCOUNTER (OUTPATIENT)
Dept: RADIOLOGY | Facility: CLINIC | Age: 20
Discharge: HOME | End: 2025-08-21
Payer: COMMERCIAL

## 2025-08-21 VITALS — WEIGHT: 292.6 LBS | DIASTOLIC BLOOD PRESSURE: 74 MMHG | BODY MASS INDEX: 47.23 KG/M2 | SYSTOLIC BLOOD PRESSURE: 126 MMHG

## 2025-08-21 DIAGNOSIS — Z23 NEED FOR DIPHTHERIA-TETANUS-PERTUSSIS (TDAP) VACCINE: ICD-10-CM

## 2025-08-21 DIAGNOSIS — O99.213 OBESITY AFFECTING PREGNANCY IN THIRD TRIMESTER (HHS-HCC): ICD-10-CM

## 2025-08-21 DIAGNOSIS — O99.019 ANEMIA, ANTEPARTUM: ICD-10-CM

## 2025-08-21 DIAGNOSIS — Z3A.28 28 WEEKS GESTATION OF PREGNANCY (HHS-HCC): ICD-10-CM

## 2025-08-21 DIAGNOSIS — O99.210 MATERNAL OBESITY, ANTEPARTUM (HHS-HCC): Primary | ICD-10-CM

## 2025-08-21 DIAGNOSIS — O10.919 CHRONIC HYPERTENSION AFFECTING PREGNANCY (HHS-HCC): ICD-10-CM

## 2025-08-21 DIAGNOSIS — O09.293 CURRENT PREGNANCY IN THIRD TRIMESTER WITH HISTORY OF SPONTANEOUS ABORTION DURING PRIOR PREGNANCY (HHS-HCC): ICD-10-CM

## 2025-08-21 DIAGNOSIS — O99.820 GROUP B STREPTOCOCCUS CARRIER STATE AFFECTING PREGNANCY: ICD-10-CM

## 2025-08-21 DIAGNOSIS — O99.211 OBESITY AFFECTING PREGNANCY IN FIRST TRIMESTER, UNSPECIFIED OBESITY TYPE (HHS-HCC): ICD-10-CM

## 2025-08-21 DIAGNOSIS — O10.019 CHRONIC BENIGN ESSENTIAL HYPERTENSION, ANTEPARTUM (HHS-HCC): ICD-10-CM

## 2025-08-21 PROCEDURE — 90715 TDAP VACCINE 7 YRS/> IM: CPT | Performed by: OBSTETRICS & GYNECOLOGY

## 2025-08-21 PROCEDURE — 0501F PRENATAL FLOW SHEET: CPT | Performed by: OBSTETRICS & GYNECOLOGY

## 2025-08-21 PROCEDURE — 90471 IMMUNIZATION ADMIN: CPT | Performed by: OBSTETRICS & GYNECOLOGY

## 2025-08-21 PROCEDURE — 76819 FETAL BIOPHYS PROFIL W/O NST: CPT

## 2025-08-21 PROCEDURE — 76816 OB US FOLLOW-UP PER FETUS: CPT

## 2025-09-02 ENCOUNTER — APPOINTMENT (OUTPATIENT)
Dept: OBSTETRICS AND GYNECOLOGY | Facility: CLINIC | Age: 20
End: 2025-09-02
Payer: COMMERCIAL

## 2025-09-18 ENCOUNTER — APPOINTMENT (OUTPATIENT)
Dept: OBSTETRICS AND GYNECOLOGY | Facility: CLINIC | Age: 20
End: 2025-09-18
Payer: COMMERCIAL

## 2025-10-02 ENCOUNTER — APPOINTMENT (OUTPATIENT)
Dept: OBSTETRICS AND GYNECOLOGY | Facility: CLINIC | Age: 20
End: 2025-10-02
Payer: COMMERCIAL

## 2025-10-16 ENCOUNTER — APPOINTMENT (OUTPATIENT)
Dept: OBSTETRICS AND GYNECOLOGY | Facility: CLINIC | Age: 20
End: 2025-10-16
Payer: COMMERCIAL

## 2025-10-23 ENCOUNTER — APPOINTMENT (OUTPATIENT)
Dept: OBSTETRICS AND GYNECOLOGY | Facility: CLINIC | Age: 20
End: 2025-10-23
Payer: COMMERCIAL

## 2025-10-30 ENCOUNTER — APPOINTMENT (OUTPATIENT)
Dept: OBSTETRICS AND GYNECOLOGY | Facility: CLINIC | Age: 20
End: 2025-10-30
Payer: COMMERCIAL

## 2025-11-06 ENCOUNTER — APPOINTMENT (OUTPATIENT)
Dept: OBSTETRICS AND GYNECOLOGY | Facility: CLINIC | Age: 20
End: 2025-11-06
Payer: COMMERCIAL

## 2025-11-13 ENCOUNTER — APPOINTMENT (OUTPATIENT)
Dept: OBSTETRICS AND GYNECOLOGY | Facility: CLINIC | Age: 20
End: 2025-11-13
Payer: COMMERCIAL